# Patient Record
Sex: MALE | NOT HISPANIC OR LATINO | Employment: FULL TIME | URBAN - METROPOLITAN AREA
[De-identification: names, ages, dates, MRNs, and addresses within clinical notes are randomized per-mention and may not be internally consistent; named-entity substitution may affect disease eponyms.]

---

## 2021-03-04 ENCOUNTER — OFFICE VISIT (OUTPATIENT)
Dept: FAMILY MEDICINE CLINIC | Facility: CLINIC | Age: 41
End: 2021-03-04

## 2021-03-04 VITALS
HEART RATE: 84 BPM | HEIGHT: 69 IN | OXYGEN SATURATION: 97 % | BODY MASS INDEX: 29.47 KG/M2 | DIASTOLIC BLOOD PRESSURE: 94 MMHG | SYSTOLIC BLOOD PRESSURE: 146 MMHG | TEMPERATURE: 98 F | WEIGHT: 199 LBS

## 2021-03-04 DIAGNOSIS — Z00.00 ANNUAL PHYSICAL EXAM: Primary | ICD-10-CM

## 2021-03-04 DIAGNOSIS — I10 ESSENTIAL HYPERTENSION: ICD-10-CM

## 2021-03-04 PROCEDURE — 99386 PREV VISIT NEW AGE 40-64: CPT | Performed by: INTERNAL MEDICINE

## 2021-03-04 RX ORDER — LISINOPRIL 10 MG/1
10 TABLET ORAL DAILY
Qty: 30 TABLET | Refills: 1 | Status: SHIPPED | OUTPATIENT
Start: 2021-03-04 | End: 2021-03-25 | Stop reason: SDUPTHER

## 2021-03-05 LAB
ALBUMIN SERPL-MCNC: 5.1 G/DL (ref 4–5)
ALBUMIN/GLOB SERPL: 2 {RATIO} (ref 1.2–2.2)
ALP SERPL-CCNC: 73 IU/L (ref 39–117)
ALT SERPL-CCNC: 44 IU/L (ref 0–44)
AST SERPL-CCNC: 22 IU/L (ref 0–40)
BILIRUB SERPL-MCNC: 0.4 MG/DL (ref 0–1.2)
BUN SERPL-MCNC: 16 MG/DL (ref 6–24)
BUN/CREAT SERPL: 16 (ref 9–20)
CALCIUM SERPL-MCNC: 10.1 MG/DL (ref 8.7–10.2)
CHLORIDE SERPL-SCNC: 102 MMOL/L (ref 96–106)
CHOLEST SERPL-MCNC: 193 MG/DL (ref 100–199)
CO2 SERPL-SCNC: 27 MMOL/L (ref 20–29)
CREAT SERPL-MCNC: 1.01 MG/DL (ref 0.76–1.27)
GLOBULIN SER CALC-MCNC: 2.6 G/DL (ref 1.5–4.5)
GLUCOSE SERPL-MCNC: 79 MG/DL (ref 65–99)
HDLC SERPL-MCNC: 52 MG/DL
LDLC SERPL CALC-MCNC: 113 MG/DL (ref 0–99)
POTASSIUM SERPL-SCNC: 4.3 MMOL/L (ref 3.5–5.2)
PROT SERPL-MCNC: 7.7 G/DL (ref 6–8.5)
SODIUM SERPL-SCNC: 142 MMOL/L (ref 134–144)
TRIGL SERPL-MCNC: 159 MG/DL (ref 0–149)
VLDLC SERPL CALC-MCNC: 28 MG/DL (ref 5–40)

## 2021-03-25 ENCOUNTER — OFFICE VISIT (OUTPATIENT)
Dept: FAMILY MEDICINE CLINIC | Facility: CLINIC | Age: 41
End: 2021-03-25

## 2021-03-25 VITALS
WEIGHT: 195 LBS | BODY MASS INDEX: 28.88 KG/M2 | TEMPERATURE: 97.8 F | HEIGHT: 69 IN | DIASTOLIC BLOOD PRESSURE: 80 MMHG | SYSTOLIC BLOOD PRESSURE: 122 MMHG | HEART RATE: 63 BPM | OXYGEN SATURATION: 99 %

## 2021-03-25 DIAGNOSIS — I10 ESSENTIAL HYPERTENSION: Primary | ICD-10-CM

## 2021-03-25 DIAGNOSIS — J30.2 SEASONAL ALLERGIES: ICD-10-CM

## 2021-03-25 PROCEDURE — 99213 OFFICE O/P EST LOW 20 MIN: CPT | Performed by: INTERNAL MEDICINE

## 2021-03-25 RX ORDER — LISINOPRIL 10 MG/1
10 TABLET ORAL DAILY
Qty: 90 TABLET | Refills: 1 | Status: SHIPPED | OUTPATIENT
Start: 2021-03-25 | End: 2021-09-30 | Stop reason: SDUPTHER

## 2021-03-25 NOTE — PROGRESS NOTES
Subjective   Donovan Warner is a 40 y.o. male.     Chief Complaint   Patient presents with   • Hypertension       History of Present Illness   Follow-up for hypertension.  Currently, has been feeling well and asymptomatic without any headaches, vision changes, cough, chest pain, shortness of breath, swelling, focal neurologic deficit, memory loss or syncope.  Has been taking the medications regularly and adherent with the regimen lisinopril 10 mg daily.  Denies medication side effects and no significant interval events.      Has had a chronic thick clear mucous in throat ever since had COVID in January.  Using claritin with slight improvement.    The following portions of the patient's history were reviewed and updated as appropriate: allergies, current medications, past family history, past medical history, past social history, past surgical history and problem list.    Depression Screen:  PHQ-2/PHQ-9 Depression Screening 3/4/2021   Little interest or pleasure in doing things 0   Feeling down, depressed, or hopeless 0   Total Score 0       Past Medical History:   Diagnosis Date   • Hypertension        Past Surgical History:   Procedure Laterality Date   • UMBILICAL HERNIA REPAIR     • VASECTOMY         Family History   Problem Relation Age of Onset   • Diabetes Mother    • Hypertension Mother    • Hyperlipidemia Mother    • Diabetes Father    • Hypertension Father        Social History     Socioeconomic History   • Marital status:      Spouse name: Not on file   • Number of children: Not on file   • Years of education: Not on file   • Highest education level: Not on file   Tobacco Use   • Smoking status: Never Smoker   • Smokeless tobacco: Never Used   Substance and Sexual Activity   • Alcohol use: Yes   • Drug use: Never   • Sexual activity: Defer       Current Outpatient Medications   Medication Sig Dispense Refill   • lisinopril (PRINIVIL,ZESTRIL) 10 MG tablet Take 1 tablet by mouth Daily. 90 tablet 1  "    No current facility-administered medications for this visit.       Review of Systems   Constitutional: Negative for activity change, appetite change, fatigue, fever, unexpected weight gain and unexpected weight loss.   HENT: Negative for nosebleeds, rhinorrhea, trouble swallowing and voice change.    Eyes: Negative for visual disturbance.   Respiratory: Negative for cough, chest tightness, shortness of breath and wheezing.    Cardiovascular: Negative for chest pain, palpitations and leg swelling.   Gastrointestinal: Negative for abdominal pain, blood in stool, constipation, diarrhea, nausea, vomiting, GERD and indigestion.   Genitourinary: Negative for dysuria, frequency and hematuria.   Musculoskeletal: Negative for arthralgias, back pain and myalgias.   Skin: Negative for rash and bruise.   Neurological: Negative for dizziness, tremors, weakness, light-headedness, numbness, headache and memory problem.   Hematological: Negative for adenopathy. Does not bruise/bleed easily.   Psychiatric/Behavioral: Negative for sleep disturbance and depressed mood. The patient is not nervous/anxious.        Objective   /80 (BP Location: Left arm, Patient Position: Sitting, Cuff Size: Adult)   Pulse 63   Temp 97.8 °F (36.6 °C) (Temporal)   Ht 175.3 cm (69.02\")   Wt 88.5 kg (195 lb)   SpO2 99%   BMI 28.78 kg/m²     Physical Exam  Vitals and nursing note reviewed.   Constitutional:       General: He is not in acute distress.     Appearance: He is well-developed. He is not diaphoretic.   HENT:      Head: Normocephalic and atraumatic.      Right Ear: External ear normal.      Left Ear: External ear normal.      Nose: Nose normal.   Eyes:      Conjunctiva/sclera: Conjunctivae normal.      Pupils: Pupils are equal, round, and reactive to light.   Neck:      Thyroid: No thyromegaly.      Trachea: No tracheal deviation.   Cardiovascular:      Rate and Rhythm: Normal rate and regular rhythm.      Heart sounds: Normal heart " sounds. No murmur heard.   No friction rub. No gallop.    Pulmonary:      Effort: Pulmonary effort is normal. No respiratory distress.      Breath sounds: Normal breath sounds.   Abdominal:      General: Bowel sounds are normal.      Palpations: Abdomen is soft. There is no mass.      Tenderness: There is no abdominal tenderness. There is no guarding.   Musculoskeletal:         General: Normal range of motion.      Cervical back: Normal range of motion and neck supple.   Lymphadenopathy:      Cervical: No cervical adenopathy.   Skin:     General: Skin is warm and dry.      Capillary Refill: Capillary refill takes less than 2 seconds.      Findings: No rash.   Neurological:      Mental Status: He is alert and oriented to person, place, and time.      Motor: No abnormal muscle tone.      Deep Tendon Reflexes: Reflexes normal.   Psychiatric:         Behavior: Behavior normal.         Thought Content: Thought content normal.         Judgment: Judgment normal.         Recent Results (from the past 2016 hour(s))   Comprehensive Metabolic Panel    Collection Time: 03/04/21 12:01 PM    Specimen: Blood   Result Value Ref Range    Glucose 79 65 - 99 mg/dL    BUN 16 6 - 24 mg/dL    Creatinine 1.01 0.76 - 1.27 mg/dL    eGFR Non African Am 93 >59 mL/min/1.73    eGFR African Am 107 >59 mL/min/1.73    BUN/Creatinine Ratio 16 9 - 20    Sodium 142 134 - 144 mmol/L    Potassium 4.3 3.5 - 5.2 mmol/L    Chloride 102 96 - 106 mmol/L    Total CO2 27 20 - 29 mmol/L    Calcium 10.1 8.7 - 10.2 mg/dL    Total Protein 7.7 6.0 - 8.5 g/dL    Albumin 5.1 (H) 4.0 - 5.0 g/dL    Globulin 2.6 1.5 - 4.5 g/dL    A/G Ratio 2.0 1.2 - 2.2    Total Bilirubin 0.4 0.0 - 1.2 mg/dL    Alkaline Phosphatase 73 39 - 117 IU/L    AST (SGOT) 22 0 - 40 IU/L    ALT (SGPT) 44 0 - 44 IU/L   Lipid Panel    Collection Time: 03/04/21 12:01 PM    Specimen: Blood   Result Value Ref Range    Total Cholesterol 193 100 - 199 mg/dL    Triglycerides 159 (H) 0 - 149 mg/dL    HDL  Cholesterol 52 >39 mg/dL    VLDL Cholesterol Sahil 28 5 - 40 mg/dL    LDL Chol Calc ([facility]) 113 (H) 0 - 99 mg/dL     Assessment/Plan   Diagnoses and all orders for this visit:    1. Essential hypertension (Primary)  -     lisinopril (PRINIVIL,ZESTRIL) 10 MG tablet; Take 1 tablet by mouth Daily.  Dispense: 90 tablet; Refill: 1    2. Seasonal allergies    Hypertension is much improved and now controlled.  Recommend that he continue with the lisinopril 10 mg daily we will recheck in 6 months.  If he has any other problems or issues as a follow-up.    Recommend to start flonase, claritin and mucinex for allergies.       · COVID-19 Precautions - Patient was compliant in wearing a mask. When I saw the patient, I used appropriate personal protective equipment (PPE) including mask and eye shield (standard procedure).  Additionally, I used gown and gloves if indicated.  Hand hygiene was completed before and after seeing the patient.  · Dictated utilizing Dragon Dictation

## 2021-04-06 ENCOUNTER — BULK ORDERING (OUTPATIENT)
Dept: CASE MANAGEMENT | Facility: OTHER | Age: 41
End: 2021-04-06

## 2021-04-06 DIAGNOSIS — Z23 IMMUNIZATION DUE: ICD-10-CM

## 2021-09-30 ENCOUNTER — OFFICE VISIT (OUTPATIENT)
Dept: FAMILY MEDICINE CLINIC | Facility: CLINIC | Age: 41
End: 2021-09-30

## 2021-09-30 VITALS
TEMPERATURE: 97.7 F | WEIGHT: 196 LBS | DIASTOLIC BLOOD PRESSURE: 88 MMHG | SYSTOLIC BLOOD PRESSURE: 128 MMHG | BODY MASS INDEX: 29.03 KG/M2 | OXYGEN SATURATION: 98 % | HEIGHT: 69 IN | HEART RATE: 60 BPM

## 2021-09-30 DIAGNOSIS — I10 ESSENTIAL HYPERTENSION: Primary | ICD-10-CM

## 2021-09-30 PROCEDURE — 99213 OFFICE O/P EST LOW 20 MIN: CPT | Performed by: INTERNAL MEDICINE

## 2021-09-30 RX ORDER — LISINOPRIL 10 MG/1
10 TABLET ORAL DAILY
Qty: 90 TABLET | Refills: 1 | Status: SHIPPED | OUTPATIENT
Start: 2021-09-30 | End: 2022-03-31 | Stop reason: SDUPTHER

## 2021-09-30 NOTE — PROGRESS NOTES
Subjective   Donovan Warner is a 41 y.o. male.     Chief Complaint   Patient presents with   • Hypertension       History of Present Illness   Follow-up for hypertension.  Currently, has been feeling well and asymptomatic without any headaches, vision changes, cough, chest pain, shortness of breath, swelling, focal neurologic deficit, memory loss or syncope.  Has been taking the medications regularly and adherent with the regimen lisinopril 10 mg daily.  Denies medication side effects and no significant interval events.      The following portions of the patient's history were reviewed and updated as appropriate: allergies, current medications, past family history, past medical history, past social history, past surgical history and problem list.    Depression Screen:  PHQ-2/PHQ-9 Depression Screening 3/4/2021   Little interest or pleasure in doing things 0   Feeling down, depressed, or hopeless 0   Total Score 0       Past Medical History:   Diagnosis Date   • Hypertension        Past Surgical History:   Procedure Laterality Date   • UMBILICAL HERNIA REPAIR     • VASECTOMY         Family History   Problem Relation Age of Onset   • Diabetes Mother    • Hypertension Mother    • Hyperlipidemia Mother    • Diabetes Father    • Hypertension Father        Social History     Socioeconomic History   • Marital status:      Spouse name: Not on file   • Number of children: Not on file   • Years of education: Not on file   • Highest education level: Not on file   Tobacco Use   • Smoking status: Never Smoker   • Smokeless tobacco: Never Used   Substance and Sexual Activity   • Alcohol use: Yes   • Drug use: Never   • Sexual activity: Defer       Current Outpatient Medications   Medication Sig Dispense Refill   • lisinopril (PRINIVIL,ZESTRIL) 10 MG tablet Take 1 tablet by mouth Daily. 90 tablet 1     No current facility-administered medications for this visit.       Review of Systems   Constitutional: Negative for  "activity change, appetite change, fatigue, fever, unexpected weight gain and unexpected weight loss.   HENT: Negative for nosebleeds, rhinorrhea, trouble swallowing and voice change.    Eyes: Negative for visual disturbance.   Respiratory: Negative for cough, chest tightness, shortness of breath and wheezing.    Cardiovascular: Negative for chest pain, palpitations and leg swelling.   Gastrointestinal: Negative for abdominal pain, blood in stool, constipation, diarrhea, nausea, vomiting, GERD and indigestion.   Genitourinary: Negative for dysuria, frequency and hematuria.   Musculoskeletal: Negative for arthralgias, back pain and myalgias.   Skin: Negative for rash and bruise.   Neurological: Negative for dizziness, tremors, weakness, light-headedness, numbness, headache and memory problem.   Hematological: Negative for adenopathy. Does not bruise/bleed easily.   Psychiatric/Behavioral: Negative for sleep disturbance and depressed mood. The patient is not nervous/anxious.        Objective   /88 (BP Location: Left arm, Patient Position: Sitting, Cuff Size: Large Adult)   Pulse 60   Temp 97.7 °F (36.5 °C) (Infrared)   Ht 175.3 cm (69.02\")   Wt 88.9 kg (196 lb)   SpO2 98%   BMI 28.93 kg/m²     Physical Exam  Vitals and nursing note reviewed.   Constitutional:       General: He is not in acute distress.     Appearance: He is well-developed. He is not diaphoretic.   HENT:      Head: Normocephalic and atraumatic.      Right Ear: External ear normal.      Left Ear: External ear normal.      Nose: Nose normal.   Eyes:      Conjunctiva/sclera: Conjunctivae normal.      Pupils: Pupils are equal, round, and reactive to light.   Neck:      Thyroid: No thyromegaly.      Trachea: No tracheal deviation.   Cardiovascular:      Rate and Rhythm: Normal rate and regular rhythm.      Heart sounds: Normal heart sounds. No murmur heard.   No friction rub. No gallop.    Pulmonary:      Effort: Pulmonary effort is normal. No " respiratory distress.      Breath sounds: Normal breath sounds.   Abdominal:      General: Bowel sounds are normal.      Palpations: Abdomen is soft. There is no mass.      Tenderness: There is no abdominal tenderness. There is no guarding.   Musculoskeletal:         General: Normal range of motion.      Cervical back: Normal range of motion and neck supple.   Lymphadenopathy:      Cervical: No cervical adenopathy.   Skin:     General: Skin is warm and dry.      Capillary Refill: Capillary refill takes less than 2 seconds.      Findings: No rash.   Neurological:      Mental Status: He is alert and oriented to person, place, and time.      Motor: No abnormal muscle tone.      Deep Tendon Reflexes: Reflexes normal.   Psychiatric:         Behavior: Behavior normal.         Thought Content: Thought content normal.         Judgment: Judgment normal.         No results found for this or any previous visit (from the past 2016 hour(s)).  Assessment/Plan   Diagnoses and all orders for this visit:    1. Essential hypertension (Primary)  -     lisinopril (PRINIVIL,ZESTRIL) 10 MG tablet; Take 1 tablet by mouth Daily.  Dispense: 90 tablet; Refill: 1    Hypertension is well controlled.  Continue lisinopril 10 mg daily and recheck 6 months to continue to monitor the blood pressure.           · COVID-19 Precautions - Patient was compliant in wearing a mask. When I saw the patient, I used appropriate personal protective equipment (PPE) including mask and eye shield (standard procedure).  Additionally, I used gown and gloves if indicated.  Hand hygiene was completed before and after seeing the patient.  · Dictated utilizing Dragon Dictation

## 2022-03-31 ENCOUNTER — PRE-PROCEDURE SCREENING (OUTPATIENT)
Dept: GASTROENTEROLOGY | Facility: CLINIC | Age: 42
End: 2022-03-31

## 2022-03-31 ENCOUNTER — OFFICE VISIT (OUTPATIENT)
Dept: FAMILY MEDICINE CLINIC | Facility: CLINIC | Age: 42
End: 2022-03-31

## 2022-03-31 VITALS
TEMPERATURE: 98.4 F | WEIGHT: 194 LBS | HEIGHT: 69 IN | BODY MASS INDEX: 28.73 KG/M2 | DIASTOLIC BLOOD PRESSURE: 84 MMHG | OXYGEN SATURATION: 98 % | SYSTOLIC BLOOD PRESSURE: 128 MMHG | HEART RATE: 74 BPM

## 2022-03-31 DIAGNOSIS — J30.2 SEASONAL ALLERGIES: ICD-10-CM

## 2022-03-31 DIAGNOSIS — R09.82 POST-NASAL DRIP: ICD-10-CM

## 2022-03-31 DIAGNOSIS — I10 ESSENTIAL HYPERTENSION: ICD-10-CM

## 2022-03-31 DIAGNOSIS — R19.8 RECTAL PRESSURE: ICD-10-CM

## 2022-03-31 DIAGNOSIS — I10 PRIMARY HYPERTENSION: Primary | ICD-10-CM

## 2022-03-31 DIAGNOSIS — Z80.0 FAMILY HISTORY OF COLON CANCER: ICD-10-CM

## 2022-03-31 PROCEDURE — 99214 OFFICE O/P EST MOD 30 MIN: CPT | Performed by: INTERNAL MEDICINE

## 2022-03-31 RX ORDER — AZELASTINE 1 MG/ML
2 SPRAY, METERED NASAL 2 TIMES DAILY
Qty: 30 ML | Refills: 6 | Status: SHIPPED | OUTPATIENT
Start: 2022-03-31

## 2022-03-31 RX ORDER — LISINOPRIL 10 MG/1
10 TABLET ORAL DAILY
Qty: 90 TABLET | Refills: 1 | Status: SHIPPED | OUTPATIENT
Start: 2022-03-31 | End: 2022-12-05

## 2022-03-31 NOTE — PROGRESS NOTES
"Subjective   Donovan Warner is a 41 y.o. male.     Chief Complaint   Patient presents with   • Hypertension   • post covid concerns     Respiratory issues       History of Present Illness   Follow-up for hypertension.  Currently, has been feeling well and asymptomatic without any headaches, vision changes, cough, chest pain, shortness of breath, swelling, focal neurologic deficit, memory loss or syncope.  Has been taking the medications regularly and adherent with the regimen lisinopril 10 mg daily.  Denies medication side effects and no significant interval events.      Patient had covid in January after return from [place] and has persistent cough with clear thick mucous in the mornings when wakes up and mild short of breath ever since like has to take a deep breath.  No chest pain or swelling.  States he feels like \"it seems more in my throat\".  Cannot tolerate flonase as causes nose bleeds easily.    Patient with history of hemmorhoids.  Has seen some blood per rectum with wiping.  Has had 3 episodes of pressure up in the rectal area that is very uncomfortable and not related to BM or need to have BM with no blood or drainage.  No changes in BMs.  Episodes last 1-2 hours till resolve.  Has family history of colon cancer in Maternal grandmother at approx 55 and maternal uncle.    The following portions of the patient's history were reviewed and updated as appropriate: allergies, current medications, past family history, past medical history, past social history, past surgical history and problem list.    Depression Screen:  PHQ-2/PHQ-9 Depression Screening 3/4/2021   Retired Total Score 0       Past Medical History:   Diagnosis Date   • Hypertension        Past Surgical History:   Procedure Laterality Date   • UMBILICAL HERNIA REPAIR     • VASECTOMY         Family History   Problem Relation Age of Onset   • Diabetes Mother    • Hypertension Mother    • Hyperlipidemia Mother    • Colon polyps Mother    • Colon " polyps Father    • Diabetes Father    • Hypertension Father    • Colon cancer Maternal Uncle    • Colon cancer Maternal Grandmother        Social History     Socioeconomic History   • Marital status:    Tobacco Use   • Smoking status: Never Smoker   • Smokeless tobacco: Never Used   Substance and Sexual Activity   • Alcohol use: Yes   • Drug use: Never   • Sexual activity: Defer       Current Outpatient Medications   Medication Sig Dispense Refill   • lisinopril (PRINIVIL,ZESTRIL) 10 MG tablet Take 1 tablet by mouth Daily. 90 tablet 1   • azelastine (ASTELIN) 0.1 % nasal spray 2 sprays into the nostril(s) as directed by provider 2 (Two) Times a Day. Use in each nostril as directed 30 mL 6     No current facility-administered medications for this visit.       Review of Systems   Constitutional: Negative for activity change, appetite change, fatigue, fever, unexpected weight gain and unexpected weight loss.   HENT: Positive for postnasal drip. Negative for nosebleeds, rhinorrhea, trouble swallowing and voice change.    Eyes: Negative for visual disturbance.   Respiratory: Positive for cough and shortness of breath. Negative for chest tightness and wheezing.    Cardiovascular: Negative for chest pain, palpitations and leg swelling.   Gastrointestinal: Negative for abdominal pain, blood in stool, constipation, diarrhea, nausea, vomiting, GERD and indigestion.   Genitourinary: Negative for dysuria, frequency and hematuria.   Musculoskeletal: Negative for arthralgias, back pain and myalgias.   Skin: Negative for rash and wound.   Neurological: Negative for dizziness, tremors, weakness, light-headedness, numbness, headache and memory problem.   Hematological: Negative for adenopathy. Does not bruise/bleed easily.   Psychiatric/Behavioral: Negative for sleep disturbance and depressed mood. The patient is not nervous/anxious.        Objective   /84 (BP Location: Left arm, Patient Position: Sitting, Cuff Size:  "Large Adult)   Pulse 74   Temp 98.4 °F (36.9 °C) (Temporal)   Ht 175.3 cm (69.02\")   Wt 88 kg (194 lb)   SpO2 98%   BMI 28.64 kg/m²     Physical Exam  Vitals and nursing note reviewed.   Constitutional:       General: He is not in acute distress.     Appearance: He is well-developed. He is not diaphoretic.   HENT:      Head: Normocephalic and atraumatic.      Right Ear: External ear normal.      Left Ear: External ear normal.      Nose: Nose normal.   Eyes:      Conjunctiva/sclera: Conjunctivae normal.      Pupils: Pupils are equal, round, and reactive to light.   Neck:      Thyroid: No thyromegaly.      Trachea: No tracheal deviation.   Cardiovascular:      Rate and Rhythm: Normal rate and regular rhythm.      Heart sounds: Normal heart sounds. No murmur heard.    No friction rub. No gallop.   Pulmonary:      Effort: Pulmonary effort is normal. No respiratory distress.      Breath sounds: Normal breath sounds.   Abdominal:      General: Bowel sounds are normal.      Palpations: Abdomen is soft. There is no mass.      Tenderness: There is no abdominal tenderness. There is no guarding.   Musculoskeletal:         General: Normal range of motion.      Cervical back: Normal range of motion and neck supple.   Lymphadenopathy:      Cervical: No cervical adenopathy.   Skin:     General: Skin is warm and dry.      Capillary Refill: Capillary refill takes less than 2 seconds.      Findings: No rash.   Neurological:      Mental Status: He is alert and oriented to person, place, and time.      Motor: No abnormal muscle tone.      Deep Tendon Reflexes: Reflexes normal.   Psychiatric:         Behavior: Behavior normal.         Thought Content: Thought content normal.         Judgment: Judgment normal.         No results found for this or any previous visit (from the past 2016 hour(s)).  Assessment/Plan   Diagnoses and all orders for this visit:    1. Primary hypertension (Primary)    2. Post-nasal drip  -     azelastine " (ASTELIN) 0.1 % nasal spray; 2 sprays into the nostril(s) as directed by provider 2 (Two) Times a Day. Use in each nostril as directed  Dispense: 30 mL; Refill: 6    3. Seasonal allergies  -     azelastine (ASTELIN) 0.1 % nasal spray; 2 sprays into the nostril(s) as directed by provider 2 (Two) Times a Day. Use in each nostril as directed  Dispense: 30 mL; Refill: 6    4. Essential hypertension  -     lisinopril (PRINIVIL,ZESTRIL) 10 MG tablet; Take 1 tablet by mouth Daily.  Dispense: 90 tablet; Refill: 1    5. Rectal pressure  -     Ambulatory Referral to Gastroenterology    6. Family history of colon cancer  -     Ambulatory Referral to Gastroenterology    Hypertension is very well controlled at this time.  No change at this time continue lisinopril 10 mg daily.  Patient with the rectal pressure and family history of colon cancer refer for gastroenterology will likely need colonoscopy evaluation.  Postnasal drip with seasonal allergies and possible post Covid issues.  We will treat with the azelastine as he has not tolerated other nasal sprays.           · COVID-19 Precautions - Patient was compliant in wearing a mask. When I saw the patient, I used appropriate personal protective equipment (PPE) including mask and eye shield (standard procedure).  Additionally, I used gown and gloves if indicated.  Hand hygiene was completed before and after seeing the patient.  · Dictated utilizing Dragon Dictation

## 2022-05-10 NOTE — TELEPHONE ENCOUNTER
Received referral. Call unable to reach pt ( colonoscopy screening)     Detail Level: Generalized Instructions: This plan will send the code FBSE to the PM system.  DO NOT or CHANGE the price. Price (Do Not Change): 0.00

## 2022-12-02 DIAGNOSIS — I10 ESSENTIAL HYPERTENSION: ICD-10-CM

## 2022-12-05 RX ORDER — LISINOPRIL 10 MG/1
TABLET ORAL
Qty: 30 TABLET | Refills: 1 | Status: SHIPPED | OUTPATIENT
Start: 2022-12-05 | End: 2023-01-09 | Stop reason: SDUPTHER

## 2022-12-05 NOTE — TELEPHONE ENCOUNTER
Rx Refill Note  Requested Prescriptions     Pending Prescriptions Disp Refills   • lisinopril (PRINIVIL,ZESTRIL) 10 MG tablet [Pharmacy Med Name: LISINOPRIL 10 MG TABLET] 30 tablet      Sig: TAKE ONE TABLET BY MOUTH DAILY      Last office visit with prescribing clinician: 3/31/2022   Next office visit with prescribing clinician: 1/9/2023

## 2023-01-09 ENCOUNTER — OFFICE VISIT (OUTPATIENT)
Dept: FAMILY MEDICINE CLINIC | Facility: CLINIC | Age: 43
End: 2023-01-09
Payer: COMMERCIAL

## 2023-01-09 VITALS
TEMPERATURE: 97.7 F | BODY MASS INDEX: 29.92 KG/M2 | DIASTOLIC BLOOD PRESSURE: 86 MMHG | SYSTOLIC BLOOD PRESSURE: 126 MMHG | HEIGHT: 69 IN | WEIGHT: 202 LBS

## 2023-01-09 DIAGNOSIS — I10 ESSENTIAL HYPERTENSION: ICD-10-CM

## 2023-01-09 DIAGNOSIS — I10 PRIMARY HYPERTENSION: ICD-10-CM

## 2023-01-09 DIAGNOSIS — K62.89 RECTAL PAIN: ICD-10-CM

## 2023-01-09 DIAGNOSIS — Z00.00 ANNUAL PHYSICAL EXAM: Primary | ICD-10-CM

## 2023-01-09 DIAGNOSIS — Z80.0 FAMILY HISTORY OF COLON CANCER: ICD-10-CM

## 2023-01-09 DIAGNOSIS — L20.82 FLEXURAL ECZEMA: ICD-10-CM

## 2023-01-09 PROCEDURE — 99396 PREV VISIT EST AGE 40-64: CPT | Performed by: INTERNAL MEDICINE

## 2023-01-09 RX ORDER — BETAMETHASONE VALERATE 1.2 MG/G
AEROSOL, FOAM TOPICAL
Qty: 50 G | Refills: 0 | Status: SHIPPED | OUTPATIENT
Start: 2023-01-09

## 2023-01-09 RX ORDER — LISINOPRIL 10 MG/1
10 TABLET ORAL DAILY
Qty: 30 TABLET | Refills: 11 | Status: SHIPPED | OUTPATIENT
Start: 2023-01-09

## 2023-01-09 NOTE — PROGRESS NOTES
Chief Complaint   Patient presents with   • Annual Exam       HPI:  Donovan Warner, -1980, is a 42 y.o. male who presents for an annual physical.    Follow-up for hypertension.  Currently, has been feeling well and asymptomatic without any headaches, vision changes, cough, chest pain, shortness of breath, swelling, focal neurologic deficit, memory loss or syncope.  Has been taking the medications regularly and adherent with the regimen lisinopril 10 mg.  Denies medication side effects and no significant interval events.      Still with occasional pressure feeling in the rectal area and a little blood with family history of colon cancer.  Patient with history of hemmorhoids.  Has seen some blood per rectum with wiping.  Has had multiple episodes of pressure up in the rectal area that is very uncomfortable and not related to BM or need to have BM with no blood or drainage previously.  No changes in BMs.  Episodes last 1-2 hours till resolve.  Has family history of colon cancer in Maternal grandmother at approx 55 and maternal uncle.    Recent Hospitalizations:  No hospitalization(s) within the last year..    Current Medical Providers:  Patient Care Team:  Quentin Delaney MD as PCP - General (Internal Medicine)    Compared to one year ago, the patient feels his physical health is the same and his mental health is the same.    Depression Screen:  PHQ-2/PHQ-9 Depression Screening 2023   Retired Total Score -   Little Interest or Pleasure in Doing Things 0-->not at all   Feeling Down, Depressed or Hopeless 0-->not at all   PHQ-9: Brief Depression Severity Measure Score 0       Past Medical/Family/Social History:  The following portions of the patient's history were reviewed and updated as appropriate: allergies, current medications, past family history, past medical history, past social history, past surgical history and problem list.    No Known Allergies    Current Outpatient Medications:   •   azelastine (ASTELIN) 0.1 % nasal spray, 2 sprays into the nostril(s) as directed by provider 2 (Two) Times a Day. Use in each nostril as directed, Disp: 30 mL, Rfl: 6  •  lisinopril (PRINIVIL,ZESTRIL) 10 MG tablet, Take 1 tablet by mouth Daily., Disp: 30 tablet, Rfl: 11  •  Betamethasone Valerate 0.12 % foam, Apply to affected area twice a day as needed., Disp: 50 g, Rfl: 0    Current medication list contains no high risk medications.  No harmful drug interactions have been identified.     Family History   Problem Relation Age of Onset   • Diabetes Mother    • Hypertension Mother    • Hyperlipidemia Mother    • Colon polyps Mother    • Colon polyps Father    • Diabetes Father    • Hypertension Father    • Colon cancer Maternal Uncle    • Colon cancer Maternal Grandmother      Social History     Tobacco Use   • Smoking status: Never   • Smokeless tobacco: Never   Substance Use Topics   • Alcohol use: Not Currently     Past Surgical History:   Procedure Laterality Date   • UMBILICAL HERNIA REPAIR     • VASECTOMY       Patient Active Problem List   Diagnosis   • Hypertension   • Annual physical exam   • Seasonal allergies   • Flexural eczema     Review of Systems   Constitutional: Negative for activity change, appetite change, fatigue, fever, unexpected weight gain and unexpected weight loss.   HENT: Negative for nosebleeds, rhinorrhea, trouble swallowing and voice change.    Eyes: Negative for visual disturbance.   Respiratory: Negative for cough, chest tightness, shortness of breath and wheezing.    Cardiovascular: Negative for chest pain, palpitations and leg swelling.   Gastrointestinal: Negative for abdominal pain, blood in stool, constipation, diarrhea, nausea, vomiting, GERD and indigestion.   Genitourinary: Negative for dysuria, frequency and hematuria.   Musculoskeletal: Negative for arthralgias, back pain and myalgias.   Skin: Negative for rash and wound.   Neurological: Negative for dizziness, tremors,  weakness, light-headedness, numbness, headache and memory problem.   Hematological: Negative for adenopathy. Does not bruise/bleed easily.   Psychiatric/Behavioral: Negative for sleep disturbance and depressed mood. The patient is not nervous/anxious.        Objective     Vitals:    01/09/23 1313   BP: 126/86   BP Location: Right arm   Patient Position: Sitting   Cuff Size: Adult   Temp: 97.7 °F (36.5 °C)   TempSrc: Temporal   Weight: 91.6 kg (202 lb)   Height: 175.3 cm (69.02\")     BMI is >= 25 and <30. (Overweight) The following options were offered after discussion;: exercise counseling/recommendations and nutrition counseling/recommendations    Physical Exam  Vitals and nursing note reviewed.   Constitutional:       General: He is not in acute distress.     Appearance: He is well-developed. He is not diaphoretic.   HENT:      Head: Normocephalic and atraumatic.      Right Ear: External ear normal.      Left Ear: External ear normal.      Nose: Nose normal.   Eyes:      Conjunctiva/sclera: Conjunctivae normal.      Pupils: Pupils are equal, round, and reactive to light.   Neck:      Thyroid: No thyromegaly.      Trachea: No tracheal deviation.   Cardiovascular:      Rate and Rhythm: Normal rate and regular rhythm.      Heart sounds: Normal heart sounds. No murmur heard.    No friction rub. No gallop.   Pulmonary:      Effort: Pulmonary effort is normal. No respiratory distress.      Breath sounds: Normal breath sounds.   Abdominal:      General: Bowel sounds are normal.      Palpations: Abdomen is soft. There is no mass.      Tenderness: There is no abdominal tenderness. There is no guarding.   Musculoskeletal:         General: Normal range of motion.      Cervical back: Normal range of motion and neck supple.   Lymphadenopathy:      Cervical: No cervical adenopathy.   Skin:     General: Skin is warm and dry.      Capillary Refill: Capillary refill takes less than 2 seconds.      Findings: No rash.    Neurological:      Mental Status: He is alert and oriented to person, place, and time.      Motor: No abnormal muscle tone.      Deep Tendon Reflexes: Reflexes normal.   Psychiatric:         Behavior: Behavior normal.         Thought Content: Thought content normal.         Judgment: Judgment normal.     Recent Lab Results:     Lab Results   Component Value Date    TRIG 159 (H) 03/04/2021    HDL 52 03/04/2021    VLDL 28 03/04/2021     Assessment & Plan   Age-appropriate Screening Schedule:  Refer to the list below for future screening recommendations based on patient's age, sex and/or medical conditions.      Health Maintenance   Topic Date Due   • INFLUENZA VACCINE  03/31/2023 (Originally 8/1/2022)   • TDAP/TD VACCINES (2 - Td or Tdap) 07/16/2025     Diagnoses and all orders for this visit:    1. Annual physical exam (Primary)    2. Rectal pain  -     Ambulatory Referral to Gastroenterology    3. Family history of colon cancer  -     Ambulatory Referral to Gastroenterology    4. Primary hypertension  -     Comprehensive Metabolic Panel  -     Lipid Panel    5. Essential hypertension  -     lisinopril (PRINIVIL,ZESTRIL) 10 MG tablet; Take 1 tablet by mouth Daily.  Dispense: 30 tablet; Refill: 11    6. Flexural eczema  -     Betamethasone Valerate 0.12 % foam; Apply to affected area twice a day as needed.  Dispense: 50 g; Refill: 0    Annual wellness visit reviewed with patient.  All past history, medications, social history, and problem list were reviewed.  Discussed advanced directives and living will.  Patient has living will: Living will: no and information packet given to patient to complete at home and bring copy to office.  Will check the labs as ordered above to evaluate the blood sugars, kidney, liver, cholesterol for screening.  Discussed flu shot recommended to get the influenza vaccine annually in the fall.  Shingrix and pneumonia vaccination series discussed.  Encouraged follow-up with the eye doctor  on annual basis.  Discussed weight and encouraged exercise as tolerated while following a healthy diet.  Reviewed sexual health and safe sex practices.  Follow up with current specialists as needed.     An After Visit Summary with all of these plans were given to the patient.        Follow Up:  No follow-ups on file.

## 2023-01-10 LAB
ALBUMIN SERPL-MCNC: 5 G/DL (ref 4–5)
ALBUMIN/GLOB SERPL: 1.9 {RATIO} (ref 1.2–2.2)
ALP SERPL-CCNC: 83 IU/L (ref 44–121)
ALT SERPL-CCNC: 54 IU/L (ref 0–44)
AST SERPL-CCNC: 25 IU/L (ref 0–40)
BILIRUB SERPL-MCNC: 0.3 MG/DL (ref 0–1.2)
BUN SERPL-MCNC: 15 MG/DL (ref 6–24)
BUN/CREAT SERPL: 16 (ref 9–20)
CALCIUM SERPL-MCNC: 10 MG/DL (ref 8.7–10.2)
CHLORIDE SERPL-SCNC: 100 MMOL/L (ref 96–106)
CHOLEST SERPL-MCNC: 216 MG/DL (ref 100–199)
CO2 SERPL-SCNC: 28 MMOL/L (ref 20–29)
CREAT SERPL-MCNC: 0.93 MG/DL (ref 0.76–1.27)
EGFRCR SERPLBLD CKD-EPI 2021: 105 ML/MIN/1.73
GLOBULIN SER CALC-MCNC: 2.7 G/DL (ref 1.5–4.5)
GLUCOSE SERPL-MCNC: 113 MG/DL (ref 70–99)
HDLC SERPL-MCNC: 47 MG/DL
LDLC SERPL CALC-MCNC: 116 MG/DL (ref 0–99)
POTASSIUM SERPL-SCNC: 4.1 MMOL/L (ref 3.5–5.2)
PROT SERPL-MCNC: 7.7 G/DL (ref 6–8.5)
SODIUM SERPL-SCNC: 141 MMOL/L (ref 134–144)
TRIGL SERPL-MCNC: 307 MG/DL (ref 0–149)
VLDLC SERPL CALC-MCNC: 53 MG/DL (ref 5–40)

## 2023-02-21 ENCOUNTER — PATIENT ROUNDING (BHMG ONLY) (OUTPATIENT)
Dept: GASTROENTEROLOGY | Facility: CLINIC | Age: 43
End: 2023-02-21
Payer: COMMERCIAL

## 2023-02-21 ENCOUNTER — OFFICE VISIT (OUTPATIENT)
Dept: GASTROENTEROLOGY | Facility: CLINIC | Age: 43
End: 2023-02-21
Payer: COMMERCIAL

## 2023-02-21 VITALS
BODY MASS INDEX: 30.45 KG/M2 | DIASTOLIC BLOOD PRESSURE: 75 MMHG | WEIGHT: 205.6 LBS | HEIGHT: 69 IN | TEMPERATURE: 96.9 F | OXYGEN SATURATION: 98 % | HEART RATE: 70 BPM | SYSTOLIC BLOOD PRESSURE: 128 MMHG

## 2023-02-21 DIAGNOSIS — Z80.0 FAMILY HISTORY OF GI MALIGNANCY: ICD-10-CM

## 2023-02-21 DIAGNOSIS — K62.89 ANAL OR RECTAL PAIN: Primary | ICD-10-CM

## 2023-02-21 DIAGNOSIS — K62.5 RECTAL BLEEDING: ICD-10-CM

## 2023-02-21 PROCEDURE — 99202 OFFICE O/P NEW SF 15 MIN: CPT | Performed by: INTERNAL MEDICINE

## 2023-02-21 RX ORDER — MULTIPLE VITAMINS W/ MINERALS TAB 9MG-400MCG
1 TAB ORAL DAILY
COMMUNITY

## 2023-02-21 NOTE — PROGRESS NOTES
Chief Complaint   Patient presents with   • Rectal Pain   • Black or Bloody Stool   • anal pressure        Donovan Warner is a  42 y.o. male here for an initial visit for rectal pain, rectal bleeding, family history    HPI this 42-year-old white male patient of Dr. Quentin Delaney presents with a history of rectal pressure and pain usually in a nocturnal setting noted for the past year but on occasional basis.  Specifically it happens every 2 to 3 months can awaken him from sleep and sometimes radiates pain to the spine.  Pain last for 1 to 2 hours and putting pressure on that area sometimes transiently effective.  He has a history of hemorrhoids dating back 15 years ago associated with strenuous activity at work that subsided when he changed his work.  He does have bleeding intermittently but without significant straining.  This is usually bright red blood on the tissue and in the bowl.  Normal bowel pattern is once daily without constipation or diarrhea.  There is a family history of polyps involving both parents and of colon cancer involving his maternal grandmother and maternal uncle.    Past Medical History:   Diagnosis Date   • Hypertension        Current Outpatient Medications   Medication Sig Dispense Refill   • Betamethasone Valerate 0.12 % foam Apply to affected area twice a day as needed. 50 g 0   • lisinopril (PRINIVIL,ZESTRIL) 10 MG tablet Take 1 tablet by mouth Daily. 30 tablet 11   • multivitamin with minerals tablet tablet Take 1 tablet by mouth Daily.     • azelastine (ASTELIN) 0.1 % nasal spray 2 sprays into the nostril(s) as directed by provider 2 (Two) Times a Day. Use in each nostril as directed 30 mL 6     No current facility-administered medications for this visit.       PRN Meds:.    No Known Allergies    Social History     Socioeconomic History   • Marital status:    Tobacco Use   • Smoking status: Never   • Smokeless tobacco: Never   Substance and Sexual Activity   • Alcohol use:  Yes     Comment: social   • Drug use: Never   • Sexual activity: Yes     Partners: Female       Family History   Problem Relation Age of Onset   • Diabetes Mother    • Hypertension Mother    • Hyperlipidemia Mother    • Colon polyps Mother    • Colon polyps Father    • Diabetes Father    • Hypertension Father    • Colon cancer Maternal Uncle    • Colon cancer Maternal Grandmother    • Prostate cancer Paternal Grandfather        Review of Systems   Constitutional: Negative for activity change, appetite change, fatigue and unexpected weight change.   HENT: Negative for congestion, facial swelling, sore throat, trouble swallowing and voice change.    Eyes: Negative for photophobia and visual disturbance.   Respiratory: Negative for cough and choking.    Cardiovascular: Negative for chest pain.   Gastrointestinal: Positive for anal bleeding and rectal pain. Negative for abdominal distention, abdominal pain, blood in stool, constipation, diarrhea, nausea and vomiting.        Tenesmus   Endocrine: Negative for polyphagia.   Musculoskeletal: Negative for arthralgias, gait problem and joint swelling.   Skin: Negative for color change, pallor and rash.   Allergic/Immunologic: Negative for food allergies.   Neurological: Negative for speech difficulty and headaches.   Hematological: Does not bruise/bleed easily.   Psychiatric/Behavioral: Negative for agitation, confusion and sleep disturbance.       Vitals:    02/21/23 0804   BP: 128/75   Pulse: 70   Temp: 96.9 °F (36.1 °C)   SpO2: 98%       Physical Exam  Vitals reviewed.   Constitutional:       General: He is not in acute distress.     Appearance: He is well-developed. He is not diaphoretic.   HENT:      Head: Normocephalic.      Mouth/Throat:      Pharynx: No oropharyngeal exudate.   Eyes:      General: No scleral icterus.     Conjunctiva/sclera: Conjunctivae normal.   Neck:      Thyroid: No thyromegaly.   Cardiovascular:      Rate and Rhythm: Normal rate and regular  rhythm.      Heart sounds: No murmur heard.  Pulmonary:      Effort: No respiratory distress.      Breath sounds: Normal breath sounds. No wheezing or rales.   Abdominal:      General: Bowel sounds are normal. There is no distension.      Palpations: Abdomen is soft. There is no mass.      Tenderness: There is no abdominal tenderness.   Musculoskeletal:         General: No tenderness. Normal range of motion.      Cervical back: Normal range of motion.   Lymphadenopathy:      Cervical: No cervical adenopathy.   Skin:     General: Skin is warm and dry.      Findings: No erythema or rash.   Neurological:      Mental Status: He is alert and oriented to person, place, and time.   Psychiatric:         Behavior: Behavior normal.         ASSESSMENT   #1 rectal pain/pressure: Symptoms consistent with tenesmus or rectal spasm  #2 rectal bleeding  #3 history of hemorrhoids  #4 family history of polyps and colon cancer      PLAN  Schedule colonoscopy  Pending endoscopic findings may consider symptomatic treatment with antispasmodics or anti-inflammatory medications.      ICD-10-CM ICD-9-CM   1. Anal or rectal pain  K62.89 569.42   2. Rectal bleeding  K62.5 569.3   3. Family history of GI malignancy  Z80.0 V16.0

## 2023-03-06 ENCOUNTER — OUTSIDE FACILITY SERVICE (OUTPATIENT)
Dept: GASTROENTEROLOGY | Facility: CLINIC | Age: 43
End: 2023-03-06
Payer: COMMERCIAL

## 2023-03-06 PROCEDURE — 45378 DIAGNOSTIC COLONOSCOPY: CPT | Performed by: INTERNAL MEDICINE

## 2024-01-19 DIAGNOSIS — I10 ESSENTIAL HYPERTENSION: ICD-10-CM

## 2024-01-19 RX ORDER — LISINOPRIL 10 MG/1
10 TABLET ORAL DAILY
Qty: 30 TABLET | Refills: 0 | Status: SHIPPED | OUTPATIENT
Start: 2024-01-19

## 2024-01-22 DIAGNOSIS — I10 ESSENTIAL HYPERTENSION: ICD-10-CM

## 2024-01-22 RX ORDER — LISINOPRIL 10 MG/1
10 TABLET ORAL DAILY
Qty: 30 TABLET | Refills: 0 | OUTPATIENT
Start: 2024-01-22

## 2024-01-22 NOTE — TELEPHONE ENCOUNTER
PATIENT CALLED FOR MEDICATION REFILL OF     lisinopril (PRINIVIL,ZESTRIL) 10 MG tablet   HE WILL BE OUT AT THE END OF JANUARY    HIS NEXT PHYSICAL APPOINTMENT IS 3/27/24  REQUESTING REFILL UNTIL HIS NEXT APPOINTMENT    [facility] [id number] - [place], [place] - [address] [place] [address] [address] - [phone]  - [phone]  [phone]     CALL BACK NUMBER [phone]

## 2024-02-28 ENCOUNTER — OFFICE VISIT (OUTPATIENT)
Dept: FAMILY MEDICINE CLINIC | Facility: CLINIC | Age: 44
End: 2024-02-28
Payer: COMMERCIAL

## 2024-02-28 VITALS
BODY MASS INDEX: 29.51 KG/M2 | DIASTOLIC BLOOD PRESSURE: 78 MMHG | SYSTOLIC BLOOD PRESSURE: 124 MMHG | HEART RATE: 73 BPM | OXYGEN SATURATION: 100 % | TEMPERATURE: 97.1 F | WEIGHT: 199.2 LBS | HEIGHT: 69 IN

## 2024-02-28 DIAGNOSIS — J30.2 SEASONAL ALLERGIES: ICD-10-CM

## 2024-02-28 DIAGNOSIS — R73.01 ELEVATED FASTING GLUCOSE: ICD-10-CM

## 2024-02-28 DIAGNOSIS — I10 PRIMARY HYPERTENSION: ICD-10-CM

## 2024-02-28 DIAGNOSIS — Z00.00 ENCOUNTER FOR ANNUAL PHYSICAL EXAM: Primary | ICD-10-CM

## 2024-02-28 DIAGNOSIS — E66.3 OVERWEIGHT WITH BODY MASS INDEX (BMI) OF 29 TO 29.9 IN ADULT: ICD-10-CM

## 2024-02-28 DIAGNOSIS — L20.82 FLEXURAL ECZEMA: ICD-10-CM

## 2024-02-28 DIAGNOSIS — Z13.6 ENCOUNTER FOR LIPID SCREENING FOR CARDIOVASCULAR DISEASE: ICD-10-CM

## 2024-02-28 DIAGNOSIS — Z13.220 ENCOUNTER FOR LIPID SCREENING FOR CARDIOVASCULAR DISEASE: ICD-10-CM

## 2024-02-28 DIAGNOSIS — R06.83 SNORING: ICD-10-CM

## 2024-02-28 PROCEDURE — 99396 PREV VISIT EST AGE 40-64: CPT | Performed by: NURSE PRACTITIONER

## 2024-02-28 RX ORDER — LISINOPRIL 10 MG/1
10 TABLET ORAL DAILY
Qty: 90 TABLET | Refills: 1 | Status: SHIPPED | OUTPATIENT
Start: 2024-02-28

## 2024-02-28 NOTE — ASSESSMENT & PLAN NOTE
Patient's (Body mass index is 29.42 kg/m².) indicates that they are overweight with health conditions that include hypertension . Weight is unchanged. BMI is is above average; BMI management plan is completed. We discussed low calorie, low carb based diet program, portion control, and increasing exercise.

## 2024-02-28 NOTE — ASSESSMENT & PLAN NOTE
Hypertension is stable and controlled  Continue current treatment regimen.  Regular aerobic exercise.  Ambulatory blood pressure monitoring.  Blood pressure will be reassessed in 6 months.  Continue Lisinopril daily.

## 2024-02-28 NOTE — PROGRESS NOTES
Subjective   Donovan Warner is a 43 y.o. male.     Chief Complaint   Patient presents with    Annual Exam     Blood work          History of Present Illness   Patient of Dr. Delaney and is new to me; patient presents for CPE with fasting labs; no new problems or concerns today; not very healthy diet; no formal exercise, has physical job; regular dental visits; no recent eye exam, has glasses as needed; no problems hearing; immunizations: declines flu or COVID-19 vaccines; colonoscopy 3/2023, repeat in 5 years; maternal GM and cousin with family history of colon cancer.     F/U HTN: takes Lisinopril daily; no cough for most part; occasional drainage in throat; does not monitor BP; no headaches; no orthostasis; no swelling.    Uses Azelastine as needed for drainage and helps.    Uses Betamethasone foam for eczema on hands and works well.      The following portions of the patient's history were reviewed and updated as appropriate: allergies, current medications, past family history, past medical history, past social history, past surgical history and problem list.      Current Outpatient Medications   Medication Sig Dispense Refill    Betamethasone Valerate 0.12 % foam Apply to affected area twice a day as needed. 50 g 0    lisinopril (PRINIVIL,ZESTRIL) 10 MG tablet Take 1 tablet by mouth Daily. 90 tablet 1    multivitamin with minerals tablet tablet Take 1 tablet by mouth Daily.      azelastine (ASTELIN) 0.1 % nasal spray 2 sprays into the nostril(s) as directed by provider 2 (Two) Times a Day. Use in each nostril as directed (Patient not taking: Reported on 2/28/2024) 30 mL 6     No current facility-administered medications for this visit.       Past Medical History:   Diagnosis Date    Hypertension        Past Surgical History:   Procedure Laterality Date    COLONOSCOPY  03/2023    diverticulosis; internal hemorrhoids    UMBILICAL HERNIA REPAIR      VASECTOMY         Family History   Problem Relation Age of  Onset    Diabetes Mother     Hypertension Mother     Hyperlipidemia Mother     Colon polyps Mother     Colon polyps Father     Diabetes Father     Hypertension Father     Colon cancer Maternal Uncle     Colon cancer Maternal Grandmother     Prostate cancer Paternal Grandfather        Social History     Socioeconomic History    Marital status:    Tobacco Use    Smoking status: Never    Smokeless tobacco: Never   Substance and Sexual Activity    Alcohol use: Not Currently     Comment: social    Drug use: Never    Sexual activity: Yes     Partners: Female     Birth control/protection: Vasectomy       Review of Systems   Constitutional:  Negative for appetite change, chills, fatigue, fever, unexpected weight gain and unexpected weight loss.   HENT:  Negative for ear pain, sinus pressure, sore throat and trouble swallowing.    Eyes:  Negative for blurred vision and discharge.   Respiratory:  Negative for chest tightness and shortness of breath. Apnea: does snore and spouse has noted periods apnea; occasional waking up SOA.   Cardiovascular:  Negative for chest pain and palpitations.   Gastrointestinal:  Negative for abdominal pain, blood in stool, constipation, diarrhea, GERD and indigestion.   Endocrine: Negative for cold intolerance, heat intolerance and polydipsia.   Genitourinary:  Negative for dysuria and frequency. Nocturia: gets up once per night to urinate, tries to avoid liquids before bed, drinks 1 gallon of sweet tea daily, some weaker stream on occasion.  Musculoskeletal:  Negative for arthralgias (no unexplained arthralgias with job) and back pain.   Skin:  Negative for skin lesions. Rash: only eczema.  Neurological:  Negative for syncope and weakness.   Hematological:  Does not bruise/bleed easily.   Psychiatric/Behavioral:  Negative for dysphoric mood and sleep disturbance (occasional trouble staying asleep, attributes to think about job). The patient is not nervous/anxious.        Objective  "  Vitals:    02/28/24 0814   BP: 124/78   BP Location: Left arm   Patient Position: Sitting   Cuff Size: Adult   Pulse: 73   Temp: 97.1 °F (36.2 °C)   SpO2: 100%   Weight: 90.4 kg (199 lb 3.2 oz)   Height: 175.3 cm (69\")     Body mass index is 29.42 kg/m².    Physical Exam  Vitals and nursing note reviewed.   Constitutional:       General: He is not in acute distress.     Appearance: He is well-developed. He is not diaphoretic.   HENT:      Head: Normocephalic and atraumatic.      Right Ear: External ear normal.      Left Ear: External ear normal.      Nose: Nose normal.   Eyes:      Extraocular Movements: Extraocular movements intact.      Conjunctiva/sclera: Conjunctivae normal.      Pupils: Pupils are equal, round, and reactive to light.   Neck:      Thyroid: No thyromegaly.      Vascular: No carotid bruit.      Trachea: No tracheal deviation.   Cardiovascular:      Rate and Rhythm: Normal rate and regular rhythm.      Pulses: Normal pulses.      Heart sounds: Normal heart sounds. No murmur heard.  Pulmonary:      Effort: Pulmonary effort is normal. No respiratory distress.      Breath sounds: Normal breath sounds.   Abdominal:      General: Bowel sounds are normal.      Palpations: Abdomen is soft. There is no hepatomegaly or splenomegaly.      Tenderness: There is no abdominal tenderness.   Musculoskeletal:         General: Normal range of motion.      Cervical back: Normal range of motion and neck supple. No bony tenderness.      Thoracic back: No bony tenderness.      Lumbar back: No bony tenderness.      Right lower leg: No edema.      Left lower leg: No edema.   Lymphadenopathy:      Cervical: No cervical adenopathy.   Skin:     General: Skin is warm and dry.      Findings: No rash.   Neurological:      Mental Status: He is alert and oriented to person, place, and time.      Cranial Nerves: No cranial nerve deficit.      Coordination: Coordination normal.      Gait: Gait normal.      Deep Tendon Reflexes: " Reflexes are normal and symmetric.   Psychiatric:         Mood and Affect: Mood normal.         Behavior: Behavior normal.         Thought Content: Thought content normal.         Judgment: Judgment normal.       Lab Results   Component Value Date    WBC 5.96 05/06/2015    RBC 5.41 05/06/2015    HGB 15.2 05/06/2015    HCT 45.2 05/06/2015    MCV 83.5 05/06/2015    MCH 28.1 05/06/2015    MCHC 33.6 05/06/2015    RDW 12.7 05/06/2015     05/06/2015    NEUTRORELPCT 55.1 05/06/2015    LYMPHORELPCT 33.7 05/06/2015    MONORELPCT 8.7 05/06/2015    EOSRELPCT 2.2 05/06/2015    BASORELPCT 0.3 05/06/2015    NEUTROABS 3.3 05/06/2015    LYMPHSABS 2.0 05/06/2015    MONOSABS 0.5 05/06/2015    EOSABS 0.1 05/06/2015    BASOSABS 0.0 05/06/2015     Lab Results   Component Value Date    GLUCOSE 113 (H) 01/09/2023    BUN 15 01/09/2023    CREATININE 0.93 01/09/2023    EGFRIFNONA 93 03/04/2021    EGFRIFAFRI 107 03/04/2021    BCR 16 01/09/2023    K 4.1 01/09/2023    CO2 28 01/09/2023    CALCIUM 10.0 01/09/2023    PROTENTOTREF 7.7 01/09/2023    ALBUMIN 5.0 01/09/2023    LABIL2 1.9 01/09/2023    AST 25 01/09/2023    ALT 54 (H) 01/09/2023      Lab Results   Component Value Date    CHLPL 216 (H) 01/09/2023    TRIG 307 (H) 01/09/2023    HDL 47 01/09/2023    VLDL 53 (H) 01/09/2023     (H) 01/09/2023     Lab Results   Component Value Date    TSH 2.37 05/06/2015       Assessment    Problem List Items Addressed This Visit       Hypertension    Current Assessment & Plan     Hypertension is stable and controlled  Continue current treatment regimen.  Regular aerobic exercise.  Ambulatory blood pressure monitoring.  Blood pressure will be reassessed in 6 months.  Continue Lisinopril daily.         Relevant Medications    lisinopril (PRINIVIL,ZESTRIL) 10 MG tablet    Other Relevant Orders    CBC & Differential    Lipid Panel With LDL / HDL Ratio    Comprehensive Metabolic Panel    Seasonal allergies    Current Assessment & Plan     Continue  Azelastine daily as needed.         Flexural eczema    Current Assessment & Plan     Continue Betamethasone foam as needed.         Overweight with body mass index (BMI) of 29 to 29.9 in adult    Current Assessment & Plan     Patient's (Body mass index is 29.42 kg/m².) indicates that they are overweight with health conditions that include hypertension . Weight is unchanged. BMI is is above average; BMI management plan is completed. We discussed low calorie, low carb based diet program, portion control, and increasing exercise.          Snoring    Relevant Orders    Ambulatory Referral to Sleep Medicine    Elevated fasting glucose    Current Assessment & Plan     Decrease intake of carbs/sugars in diet.  Decrease intake of sweet tea.         Relevant Orders    Hemoglobin A1c     Other Visit Diagnoses       Encounter for annual physical exam    -  Primary    Relevant Orders    CBC & Differential    Lipid Panel With LDL / HDL Ratio    Comprehensive Metabolic Panel    Encounter for lipid screening for cardiovascular disease        Relevant Orders    Lipid Panel With LDL / HDL Ratio             Return in about 6 months (around 8/28/2024) for Recheck.or sooner if problems or concerns.  Impression: Health maintenance visit.  Currently, eats a not very healthy diet and has a fair exercise routine.  Colorectal cancer screening: UTD.  Prostate cancer screening: not indicated.  Screening lab work includes: CMP, lipid.  Immunizations: declines flu or COVID-19 vaccines; risks and benefits of immunizations were discussed with patient.  Patient was advised to be evaluated by ophthalmology.  Advice and education were given regarding nutrition and aerobic exercise.

## 2024-02-28 NOTE — PATIENT INSTRUCTIONS
Monitor your blood pressure periodically and record results.  Continue to work on healthy diet and exercise.  Follow up pending lab results.  Follow up in 6 months, or sooner if problems or concerns.

## 2024-02-29 ENCOUNTER — PATIENT ROUNDING (BHMG ONLY) (OUTPATIENT)
Dept: FAMILY MEDICINE CLINIC | Facility: CLINIC | Age: 44
End: 2024-02-29
Payer: COMMERCIAL

## 2024-02-29 LAB
ALBUMIN SERPL-MCNC: 4.9 G/DL (ref 3.5–5.2)
ALBUMIN/GLOB SERPL: 2.1 G/DL
ALP SERPL-CCNC: 77 U/L (ref 39–117)
ALT SERPL-CCNC: 48 U/L (ref 1–41)
AST SERPL-CCNC: 24 U/L (ref 1–40)
BASOPHILS # BLD AUTO: 0.04 10*3/MM3 (ref 0–0.2)
BASOPHILS NFR BLD AUTO: 0.6 % (ref 0–1.5)
BILIRUB SERPL-MCNC: 0.5 MG/DL (ref 0–1.2)
BUN SERPL-MCNC: 17 MG/DL (ref 6–20)
BUN/CREAT SERPL: 16.2 (ref 7–25)
CALCIUM SERPL-MCNC: 10 MG/DL (ref 8.6–10.5)
CHLORIDE SERPL-SCNC: 101 MMOL/L (ref 98–107)
CHOLEST SERPL-MCNC: 201 MG/DL (ref 0–200)
CO2 SERPL-SCNC: 26.7 MMOL/L (ref 22–29)
CREAT SERPL-MCNC: 1.05 MG/DL (ref 0.76–1.27)
EGFRCR SERPLBLD CKD-EPI 2021: 90.3 ML/MIN/1.73
EOSINOPHIL # BLD AUTO: 0.07 10*3/MM3 (ref 0–0.4)
EOSINOPHIL NFR BLD AUTO: 1.1 % (ref 0.3–6.2)
ERYTHROCYTE [DISTWIDTH] IN BLOOD BY AUTOMATED COUNT: 12.6 % (ref 12.3–15.4)
GLOBULIN SER CALC-MCNC: 2.3 GM/DL
GLUCOSE SERPL-MCNC: 91 MG/DL (ref 65–99)
HBA1C MFR BLD: 5.4 % (ref 4.8–5.6)
HCT VFR BLD AUTO: 47.4 % (ref 37.5–51)
HDLC SERPL-MCNC: 51 MG/DL (ref 40–60)
HGB BLD-MCNC: 16.2 G/DL (ref 13–17.7)
IMM GRANULOCYTES # BLD AUTO: 0.02 10*3/MM3 (ref 0–0.05)
IMM GRANULOCYTES NFR BLD AUTO: 0.3 % (ref 0–0.5)
LDLC SERPL CALC-MCNC: 129 MG/DL (ref 0–100)
LDLC/HDLC SERPL: 2.49 {RATIO}
LYMPHOCYTES # BLD AUTO: 2.85 10*3/MM3 (ref 0.7–3.1)
LYMPHOCYTES NFR BLD AUTO: 45 % (ref 19.6–45.3)
MCH RBC QN AUTO: 28.8 PG (ref 26.6–33)
MCHC RBC AUTO-ENTMCNC: 34.2 G/DL (ref 31.5–35.7)
MCV RBC AUTO: 84.3 FL (ref 79–97)
MONOCYTES # BLD AUTO: 0.53 10*3/MM3 (ref 0.1–0.9)
MONOCYTES NFR BLD AUTO: 8.4 % (ref 5–12)
NEUTROPHILS # BLD AUTO: 2.82 10*3/MM3 (ref 1.7–7)
NEUTROPHILS NFR BLD AUTO: 44.6 % (ref 42.7–76)
NRBC BLD AUTO-RTO: 0 /100 WBC (ref 0–0.2)
PLATELET # BLD AUTO: 146 10*3/MM3 (ref 140–450)
POTASSIUM SERPL-SCNC: 4.5 MMOL/L (ref 3.5–5.2)
PROT SERPL-MCNC: 7.2 G/DL (ref 6–8.5)
RBC # BLD AUTO: 5.62 10*6/MM3 (ref 4.14–5.8)
SODIUM SERPL-SCNC: 139 MMOL/L (ref 136–145)
TRIGL SERPL-MCNC: 115 MG/DL (ref 0–150)
VLDLC SERPL CALC-MCNC: 21 MG/DL (ref 5–40)
WBC # BLD AUTO: 6.33 10*3/MM3 (ref 3.4–10.8)

## 2024-02-29 NOTE — PROGRESS NOTES
A My-Chart message has been sent to the patient for PATIENT ROUNDING with [facility] [place]

## 2024-08-23 DIAGNOSIS — I10 PRIMARY HYPERTENSION: ICD-10-CM

## 2024-08-23 RX ORDER — LISINOPRIL 10 MG/1
10 TABLET ORAL DAILY
Qty: 90 TABLET | Refills: 1 | Status: SHIPPED | OUTPATIENT
Start: 2024-08-23

## 2024-08-29 ENCOUNTER — OFFICE VISIT (OUTPATIENT)
Dept: FAMILY MEDICINE CLINIC | Facility: CLINIC | Age: 44
End: 2024-08-29
Payer: COMMERCIAL

## 2024-08-29 VITALS
BODY MASS INDEX: 29.33 KG/M2 | TEMPERATURE: 98.4 F | WEIGHT: 198 LBS | OXYGEN SATURATION: 96 % | HEIGHT: 69 IN | HEART RATE: 59 BPM | SYSTOLIC BLOOD PRESSURE: 122 MMHG | DIASTOLIC BLOOD PRESSURE: 84 MMHG

## 2024-08-29 DIAGNOSIS — I10 PRIMARY HYPERTENSION: Primary | ICD-10-CM

## 2024-08-29 DIAGNOSIS — L20.82 FLEXURAL ECZEMA: ICD-10-CM

## 2024-08-29 PROCEDURE — 99214 OFFICE O/P EST MOD 30 MIN: CPT | Performed by: INTERNAL MEDICINE

## 2024-08-29 RX ORDER — CRISABOROLE 20 MG/G
1 OINTMENT TOPICAL 2 TIMES DAILY
Qty: 60 G | Refills: 3 | Status: SHIPPED | OUTPATIENT
Start: 2024-08-29

## 2024-08-29 NOTE — PROGRESS NOTES
Subjective   Donovan Warner is a 44 y.o. male.     Chief Complaint   Patient presents with   • Hypertension       History of Present Illness   Follow-up for hypertension.  Currently, has been feeling well and asymptomatic without any headaches, vision changes, cough, chest pain, shortness of breath, swelling, focal neurologic deficit, memory loss or syncope.  Has been taking the medications regularly and adherent with the regimen lisinopril 10 mg.  Denies medication side effects and no significant interval events.      Has history of eczema between and on all fingers bilaterally.  Using betamethasone valerate as needed but seems to dry out.    The following portions of the patient's history were reviewed and updated as appropriate: allergies, current medications, past family history, past medical history, past social history, past surgical history and problem list.    Depression Screen:      2/28/2024     8:18 AM   PHQ-2/PHQ-9 Depression Screening   Little Interest or Pleasure in Doing Things 0-->not at all   Feeling Down, Depressed or Hopeless 0-->not at all   PHQ-9: Brief Depression Severity Measure Score 0       Past Medical History:   Diagnosis Date   • Hypertension        Past Surgical History:   Procedure Laterality Date   • COLONOSCOPY  03/2023    diverticulosis; internal hemorrhoids   • UMBILICAL HERNIA REPAIR     • VASECTOMY         Family History   Problem Relation Age of Onset   • Diabetes Mother    • Hypertension Mother    • Hyperlipidemia Mother    • Colon polyps Mother    • Colon polyps Father    • Diabetes Father    • Hypertension Father    • Colon cancer Maternal Uncle    • Colon cancer Maternal Grandmother    • Prostate cancer Paternal Grandfather        Social History     Socioeconomic History   • Marital status:    Tobacco Use   • Smoking status: Never   • Smokeless tobacco: Never   Vaping Use   • Vaping status: Never Used   Substance and Sexual Activity   • Alcohol use: Not Currently  "    Comment: social   • Drug use: Never   • Sexual activity: Yes     Partners: Female     Birth control/protection: Vasectomy       Current Outpatient Medications   Medication Sig Dispense Refill   • azelastine (ASTELIN) 0.1 % nasal spray 2 sprays into the nostril(s) as directed by provider 2 (Two) Times a Day. Use in each nostril as directed 30 mL 6   • Betamethasone Valerate 0.12 % foam Apply to affected area twice a day as needed. 50 g 0   • lisinopril (PRINIVIL,ZESTRIL) 10 MG tablet TAKE 1 TABLET BY MOUTH DAILY 90 tablet 1   • multivitamin with minerals tablet tablet Take 1 tablet by mouth Daily.       No current facility-administered medications for this visit.       Review of Systems   Constitutional:  Negative for activity change, appetite change, fatigue, fever, unexpected weight gain and unexpected weight loss.   HENT:  Negative for nosebleeds, rhinorrhea, trouble swallowing and voice change.    Eyes:  Negative for visual disturbance.   Respiratory:  Negative for cough, chest tightness, shortness of breath and wheezing.    Cardiovascular:  Negative for chest pain, palpitations and leg swelling.   Gastrointestinal:  Negative for abdominal pain, blood in stool, constipation, diarrhea, nausea, vomiting, GERD and indigestion.   Genitourinary:  Negative for dysuria, frequency and hematuria.   Musculoskeletal:  Negative for arthralgias, back pain and myalgias.   Skin:  Positive for rash. Negative for wound.   Neurological:  Negative for dizziness, tremors, weakness, light-headedness, numbness, headache and memory problem.   Hematological:  Negative for adenopathy. Does not bruise/bleed easily.   Psychiatric/Behavioral:  Negative for sleep disturbance and depressed mood. The patient is not nervous/anxious.      Objective   /84 (BP Location: Left arm, Patient Position: Sitting, Cuff Size: Adult)   Pulse 59   Temp 98.4 °F (36.9 °C) (Temporal)   Ht 175.3 cm (69\")   Wt 89.8 kg (198 lb)   SpO2 96%   BMI " 29.24 kg/m²     Physical Exam  Vitals and nursing note reviewed.   Constitutional:       General: He is not in acute distress.     Appearance: He is well-developed. He is not diaphoretic.   HENT:      Head: Normocephalic and atraumatic.      Right Ear: External ear normal.      Left Ear: External ear normal.      Nose: Nose normal.   Eyes:      Conjunctiva/sclera: Conjunctivae normal.      Pupils: Pupils are equal, round, and reactive to light.   Neck:      Thyroid: No thyromegaly.      Trachea: No tracheal deviation.   Cardiovascular:      Rate and Rhythm: Normal rate and regular rhythm.      Heart sounds: Normal heart sounds. No murmur heard.     No friction rub. No gallop.   Pulmonary:      Effort: Pulmonary effort is normal. No respiratory distress.      Breath sounds: Normal breath sounds.   Abdominal:      General: Bowel sounds are normal.      Palpations: Abdomen is soft. There is no mass.      Tenderness: There is no abdominal tenderness. There is no guarding.   Musculoskeletal:         General: Normal range of motion.      Cervical back: Normal range of motion and neck supple.   Lymphadenopathy:      Cervical: No cervical adenopathy.   Skin:     General: Skin is warm and dry.      Capillary Refill: Capillary refill takes less than 2 seconds.      Findings: No rash.   Neurological:      Mental Status: He is alert and oriented to person, place, and time.      Motor: No abnormal muscle tone.      Deep Tendon Reflexes: Reflexes normal.   Psychiatric:         Behavior: Behavior normal.         Thought Content: Thought content normal.         Judgment: Judgment normal.     No results found for this or any previous visit (from the past 2016 hour(s)).  Assessment & Plan   There are no diagnoses linked to this encounter.           Dictated utilizing Dragon Dictation

## 2024-09-05 ENCOUNTER — PRIOR AUTHORIZATION (OUTPATIENT)
Dept: FAMILY MEDICINE CLINIC | Facility: CLINIC | Age: 44
End: 2024-09-05
Payer: COMMERCIAL

## 2024-09-05 ENCOUNTER — TELEPHONE (OUTPATIENT)
Dept: FAMILY MEDICINE CLINIC | Facility: CLINIC | Age: 44
End: 2024-09-05
Payer: COMMERCIAL

## 2024-09-05 ENCOUNTER — PATIENT ROUNDING (BHMG ONLY) (OUTPATIENT)
Dept: FAMILY MEDICINE CLINIC | Facility: CLINIC | Age: 44
End: 2024-09-05
Payer: COMMERCIAL

## 2024-09-05 NOTE — PROGRESS NOTES
A My-Chart message has been sent to the patient for PATIENT ROUNDING with [facility] [place]

## 2024-09-05 NOTE — TELEPHONE ENCOUNTER
Prior authorization for eucrisa has been sent to the plan     Key: [id number]    Prior authorization has approved

## 2025-02-20 ENCOUNTER — OFFICE VISIT (OUTPATIENT)
Dept: FAMILY MEDICINE CLINIC | Facility: CLINIC | Age: 45
End: 2025-02-20
Payer: COMMERCIAL

## 2025-02-20 VITALS
HEART RATE: 78 BPM | DIASTOLIC BLOOD PRESSURE: 80 MMHG | OXYGEN SATURATION: 98 % | SYSTOLIC BLOOD PRESSURE: 108 MMHG | BODY MASS INDEX: 30.13 KG/M2 | WEIGHT: 203.4 LBS | TEMPERATURE: 97.1 F | HEIGHT: 69 IN

## 2025-02-20 DIAGNOSIS — E66.811 OBESITY, CLASS I, BMI 30-34.9: ICD-10-CM

## 2025-02-20 DIAGNOSIS — I10 PRIMARY HYPERTENSION: Primary | ICD-10-CM

## 2025-02-20 DIAGNOSIS — Z00.00 ANNUAL PHYSICAL EXAM: ICD-10-CM

## 2025-02-20 DIAGNOSIS — L20.82 FLEXURAL ECZEMA: ICD-10-CM

## 2025-02-20 PROCEDURE — 99214 OFFICE O/P EST MOD 30 MIN: CPT | Performed by: INTERNAL MEDICINE

## 2025-02-20 RX ORDER — LISINOPRIL 10 MG/1
10 TABLET ORAL DAILY
Qty: 90 TABLET | Refills: 3 | Status: SHIPPED | OUTPATIENT
Start: 2025-02-20

## 2025-02-20 NOTE — PROGRESS NOTES
Subjective   Donovan Warner is a 44 y.o. male.     Chief Complaint   Patient presents with    Hypertension       History of Present Illness   Follow-up for hypertension.  Currently, has been feeling well and asymptomatic without any headaches, vision changes, cough, chest pain, shortness of breath, swelling, focal neurologic deficit, memory loss or syncope.  Has been taking the medications regularly and adherent with the regimen lisinopril 10 mg.  Denies medication side effects and no significant interval events.       Has history of eczema between and on all fingers bilaterally.  Using betamethasone valerate as needed but seems to dry out.    The following portions of the patient's history were reviewed and updated as appropriate: allergies, current medications, past family history, past medical history, past social history, past surgical history and problem list.    Depression Screen:      2/20/2025     8:07 AM   PHQ-2/PHQ-9 Depression Screening   Little interest or pleasure in doing things Not at all   Feeling down, depressed, or hopeless Not at all       Past Medical History:   Diagnosis Date    Hypertension        Past Surgical History:   Procedure Laterality Date    COLONOSCOPY  03/2023    diverticulosis; internal hemorrhoids    UMBILICAL HERNIA REPAIR      VASECTOMY         Family History   Problem Relation Age of Onset    Diabetes Mother     Hypertension Mother     Hyperlipidemia Mother     Colon polyps Mother     Colon polyps Father     Diabetes Father     Hypertension Father     Colon cancer Maternal Uncle     Colon cancer Maternal Grandmother     Prostate cancer Paternal Grandfather        Social History     Socioeconomic History    Marital status:    Tobacco Use    Smoking status: Never    Smokeless tobacco: Never   Vaping Use    Vaping status: Never Used   Substance and Sexual Activity    Alcohol use: Not Currently     Comment: social    Drug use: Never    Sexual activity: Yes     Partners:  Female     Birth control/protection: Vasectomy       Current Outpatient Medications   Medication Sig Dispense Refill    azelastine (ASTELIN) 0.1 % nasal spray 2 sprays into the nostril(s) as directed by provider 2 (Two) Times a Day. Use in each nostril as directed 30 mL 6    Betamethasone Valerate 0.12 % foam Apply to affected area twice a day as needed. 50 g 0    Crisaborole (Eucrisa) 2 % ointment Apply 1 Application topically 2 (Two) Times a Day. 60 g 3    lisinopril (PRINIVIL,ZESTRIL) 10 MG tablet Take 1 tablet by mouth Daily. 90 tablet 3    multivitamin with minerals tablet tablet Take 1 tablet by mouth Daily.       No current facility-administered medications for this visit.       Review of Systems   Constitutional:  Negative for activity change, appetite change, chills, diaphoresis, fatigue, fever, unexpected weight gain and unexpected weight loss.   HENT:  Negative for congestion, nosebleeds, rhinorrhea, sore throat, swollen glands, trouble swallowing and voice change.    Eyes:  Negative for visual disturbance.   Respiratory:  Negative for cough, chest tightness, shortness of breath and wheezing.    Cardiovascular:  Negative for chest pain, palpitations and leg swelling.   Gastrointestinal:  Negative for abdominal pain, blood in stool, constipation, diarrhea, nausea, vomiting, GERD and indigestion.   Genitourinary:  Negative for dysuria, frequency and hematuria.   Musculoskeletal:  Negative for arthralgias, back pain, myalgias and neck pain.   Skin:  Positive for dry skin and rash. Negative for wound.   Neurological:  Negative for dizziness, tremors, weakness, light-headedness, numbness, headache and memory problem.   Hematological:  Negative for adenopathy. Does not bruise/bleed easily.   Psychiatric/Behavioral:  Negative for sleep disturbance and depressed mood. The patient is not nervous/anxious.        Objective   /80 (BP Location: Left arm, Patient Position: Sitting, Cuff Size: Large Adult)    "Pulse 78   Temp 97.1 °F (36.2 °C) (Temporal)   Ht 175.3 cm (69.02\")   Wt 92.3 kg (203 lb 6.4 oz)   SpO2 98%   BMI 30.02 kg/m²     Physical Exam  Vitals and nursing note reviewed.   Constitutional:       General: He is not in acute distress.     Appearance: He is well-developed. He is not diaphoretic.   HENT:      Head: Normocephalic and atraumatic.      Right Ear: External ear normal.      Left Ear: External ear normal.      Nose: Nose normal.   Eyes:      Conjunctiva/sclera: Conjunctivae normal.      Pupils: Pupils are equal, round, and reactive to light.   Neck:      Thyroid: No thyromegaly.      Trachea: No tracheal deviation.   Cardiovascular:      Rate and Rhythm: Normal rate and regular rhythm.      Heart sounds: Normal heart sounds. No murmur heard.     No friction rub. No gallop.   Pulmonary:      Effort: Pulmonary effort is normal. No respiratory distress.      Breath sounds: Normal breath sounds.   Abdominal:      General: Bowel sounds are normal.      Palpations: Abdomen is soft. There is no mass.      Tenderness: There is no abdominal tenderness. There is no guarding.   Musculoskeletal:         General: Normal range of motion.      Cervical back: Normal range of motion and neck supple.   Lymphadenopathy:      Cervical: No cervical adenopathy.   Skin:     General: Skin is warm and dry.      Capillary Refill: Capillary refill takes less than 2 seconds.      Comments: Patient with severe dry skin on the palms and around the fingers around to the dorsal aspect of the hand right greater than left with some scabbed cracks.  Mild eczema in the popliteal regions.   Neurological:      Mental Status: He is alert and oriented to person, place, and time.      Motor: No abnormal muscle tone.      Deep Tendon Reflexes: Reflexes normal.   Psychiatric:         Behavior: Behavior normal.         Thought Content: Thought content normal.         Judgment: Judgment normal.         No results found for this or any " previous visit (from the past 12 weeks).  Assessment & Plan   Diagnoses and all orders for this visit:    1. Primary hypertension (Primary)  -     lisinopril (PRINIVIL,ZESTRIL) 10 MG tablet; Take 1 tablet by mouth Daily.  Dispense: 90 tablet; Refill: 3  -     Comprehensive Metabolic Panel  -     Lipid Panel    2. Obesity, Class I, BMI 30-34.9    3. Annual physical exam    4. Flexural eczema  -     Ambulatory Referral to Dermatology    Hypertension is well-controlled.  We discussed weight and encouraged diet exercise trying to lose weight especially as the spring comes along.  Modify diet.  Low-salt.  We discussed his eczema which is mostly on his hands right greater than left but also in the popliteal regions.  He is not tolerating the topicals with cracking skin with prolonged use.  Will refer to dermatology.           Dictated utilizing Dragon Dictation

## 2025-02-21 DIAGNOSIS — I10 PRIMARY HYPERTENSION: ICD-10-CM

## 2025-02-21 LAB
ALBUMIN SERPL-MCNC: 4.5 G/DL (ref 3.5–5.2)
ALBUMIN/GLOB SERPL: 1.7 G/DL
ALP SERPL-CCNC: 74 U/L (ref 39–117)
ALT SERPL-CCNC: 42 U/L (ref 1–41)
AST SERPL-CCNC: 21 U/L (ref 1–40)
BILIRUB SERPL-MCNC: 0.4 MG/DL (ref 0–1.2)
BUN SERPL-MCNC: 18 MG/DL (ref 6–20)
BUN/CREAT SERPL: 16.7 (ref 7–25)
CALCIUM SERPL-MCNC: 9.7 MG/DL (ref 8.6–10.5)
CHLORIDE SERPL-SCNC: 102 MMOL/L (ref 98–107)
CHOLEST SERPL-MCNC: 199 MG/DL (ref 0–200)
CO2 SERPL-SCNC: 26.5 MMOL/L (ref 22–29)
CREAT SERPL-MCNC: 1.08 MG/DL (ref 0.76–1.27)
EGFRCR SERPLBLD CKD-EPI 2021: 86.8 ML/MIN/1.73
GLOBULIN SER CALC-MCNC: 2.6 GM/DL
GLUCOSE SERPL-MCNC: 80 MG/DL (ref 65–99)
HDLC SERPL-MCNC: 46 MG/DL (ref 40–60)
LDLC SERPL CALC-MCNC: 128 MG/DL (ref 0–100)
POTASSIUM SERPL-SCNC: 4.2 MMOL/L (ref 3.5–5.2)
PROT SERPL-MCNC: 7.1 G/DL (ref 6–8.5)
SODIUM SERPL-SCNC: 141 MMOL/L (ref 136–145)
TRIGL SERPL-MCNC: 137 MG/DL (ref 0–150)
VLDLC SERPL CALC-MCNC: 25 MG/DL (ref 5–40)

## 2025-02-21 RX ORDER — LISINOPRIL 10 MG/1
10 TABLET ORAL DAILY
Qty: 30 TABLET | OUTPATIENT
Start: 2025-02-21

## 2025-04-25 ENCOUNTER — TELEPHONE (OUTPATIENT)
Dept: FAMILY MEDICINE CLINIC | Facility: CLINIC | Age: 45
End: 2025-04-25
Payer: COMMERCIAL

## 2025-08-21 ENCOUNTER — OFFICE VISIT (OUTPATIENT)
Dept: FAMILY MEDICINE CLINIC | Facility: CLINIC | Age: 45
End: 2025-08-21
Payer: COMMERCIAL

## 2025-08-21 VITALS
OXYGEN SATURATION: 97 % | HEART RATE: 59 BPM | SYSTOLIC BLOOD PRESSURE: 118 MMHG | BODY MASS INDEX: 30.1 KG/M2 | WEIGHT: 203.2 LBS | DIASTOLIC BLOOD PRESSURE: 80 MMHG | TEMPERATURE: 98.3 F | HEIGHT: 69 IN

## 2025-08-21 DIAGNOSIS — Z00.00 ANNUAL PHYSICAL EXAM: Primary | ICD-10-CM

## 2025-08-21 DIAGNOSIS — Z23 IMMUNIZATION DUE: ICD-10-CM

## 2025-08-21 DIAGNOSIS — I10 PRIMARY HYPERTENSION: ICD-10-CM

## 2025-08-21 RX ORDER — DUPILUMAB 300 MG/2ML
INJECTION, SOLUTION SUBCUTANEOUS
COMMUNITY
Start: 2025-05-20